# Patient Record
Sex: FEMALE | Race: OTHER | Employment: UNEMPLOYED | URBAN - METROPOLITAN AREA
[De-identification: names, ages, dates, MRNs, and addresses within clinical notes are randomized per-mention and may not be internally consistent; named-entity substitution may affect disease eponyms.]

---

## 2017-07-15 ENCOUNTER — OFFICE VISIT (OUTPATIENT)
Dept: FAMILY MEDICINE CLINIC | Age: 52
End: 2017-07-15

## 2017-07-15 ENCOUNTER — HOSPITAL ENCOUNTER (OUTPATIENT)
Dept: LAB | Age: 52
Discharge: HOME | End: 2017-07-15

## 2017-07-15 VITALS
HEIGHT: 59 IN | WEIGHT: 242 LBS | SYSTOLIC BLOOD PRESSURE: 130 MMHG | HEART RATE: 72 BPM | DIASTOLIC BLOOD PRESSURE: 71 MMHG | OXYGEN SATURATION: 99 % | BODY MASS INDEX: 48.79 KG/M2 | TEMPERATURE: 98.2 F

## 2017-07-15 DIAGNOSIS — I10 ESSENTIAL HYPERTENSION: Primary | ICD-10-CM

## 2017-07-15 DIAGNOSIS — R73.03 PREDIABETES: ICD-10-CM

## 2017-07-15 DIAGNOSIS — I10 ESSENTIAL HYPERTENSION: ICD-10-CM

## 2017-07-15 LAB
CHOLEST SERPL-MCNC: 179 MG/DL
GLUCOSE POC: NORMAL MG/DL
HDLC SERPL-MCNC: 53 MG/DL
HDLC SERPL: 3.4 {RATIO} (ref 0–5)
LDLC SERPL CALC-MCNC: 100.4 MG/DL (ref 0–100)
LIPID PROFILE,FLP: ABNORMAL
TRIGL SERPL-MCNC: 128 MG/DL (ref ?–150)
VLDLC SERPL CALC-MCNC: 25.6 MG/DL

## 2017-07-15 PROCEDURE — 80061 LIPID PANEL: CPT | Performed by: NURSE PRACTITIONER

## 2017-07-15 RX ORDER — ATENOLOL AND CHLORTHALIDONE TABLET 50; 25 MG/1; MG/1
0.5 TABLET ORAL DAILY
Qty: 90 TAB | Refills: 1 | Status: SHIPPED | OUTPATIENT
Start: 2017-07-15 | End: 2019-04-02 | Stop reason: SDUPTHER

## 2017-07-15 NOTE — PROGRESS NOTES
Assessment/Plan:       ICD-10-CM ICD-9-CM    1. Essential hypertension I10 401.9 atenolol-chlorthalidone (TENORETIC) 50-25 mg per tablet      LIPID PANEL   2. Prediabetes R73.03 790.29 AMB POC GLUCOSE BLOOD, BY GLUCOSE MONITORING DEVICE       [facility]  Subjective:     Chief Complaint   Patient presents with    Hypertension     f/u    Medication Refill    Aura D [address] is a 47 y.o. PATIENT REFUSED female. HPI: She has been going to the gym. She can't go back, no transportation. 2 months ago she has been taking 1/2 of the pill. Takes it in middle of the afternoon. She felt a headache immediately and dizzy, lasted for 5-10 minutes. This started 2 months ago. Started 5 months ago with the gym for 3 months. She has no more dizziness and headaches. She eats less. Not eating fat food for 5 months. She  has a past medical history of Hypertension; Obesity; and Prediabetes. She also has no past medical history of Diabetes ([address] or Sleep apnea. She has Hypertension, Prediabetes, and Slow transit constipation on her problem list.   Review of Systems: Negative for: fever, chest pain, shortness of breath, leg swelling, exertional dyspnea, palpitations. Current Medications:   Current Outpatient Prescriptions on File Prior to Visit   Medication Sig    [DISCONTINUED] atenolol-chlorthalidone (TENORETIC) 50-25 mg per tablet Take 1 Tab by mouth daily. nadine 1 por la boca diaria    omega 3-dha-epa-fish oil (FISH OIL) 900-1,400 mg cpDR Twice a day    aspirin delayed-release 81 mg tablet Take 1 Tab by mouth daily. To protect your heart. Mongolian sig     No current facility-administered medications on file prior to visit. Past Surgical History: She  has no past surgical history on file. Social and Family History: She  reports that she has never smoked. She does not have any smokeless tobacco history on file. She reports that she does not drink alcohol or use illicit drugs. ; family history is negative for Hypertension and Diabetes. Objective:     Vitals:    07/15/17 1044   BP: 130/71   Pulse: 72   Temp: 98.2 °F (36.8 °C)   TempSrc: Oral   SpO2: 99%   Weight: 242 lb (109.8 kg)   Height: 4' 10.66\" (1.49 m)    Patient's last menstrual period was 06/02/2014. Wt Readings from Last 2 Encounters:   07/15/17 242 lb (109.8 kg)   10/01/16 241 lb (109.3 kg)   We discussed she is 1 lb more than her last weight currently. Results for orders placed or performed in visit on 07/15/17   AMB POC GLUCOSE BLOOD, BY GLUCOSE MONITORING DEVICE   Result Value Ref Range    Glucose POC 98 F mg/dL      Physical Examination:  General appearance - well developed, no acute distress. Chest - clear to auscultation. Heart - regular rate and rhythm without murmurs, rubs, or gallops. Abdomen - bowel sounds present x 4, NT, ND. Extremities - pulses intact. No peripheral edema. Assessment/Plan:   Rafael Monterroso was seen today for hypertension and medication refill. Diagnoses and all orders for this visit:    Essential hypertension  -     atenolol-chlorthalidone (TENORETIC) 50-25 mg per tablet; Take 0.5 Tabs by mouth daily. nadine la mitad por la boca diaria  -     LIPID PANEL; Future    Prediabetes  -     AMB POC GLUCOSE BLOOD, BY GLUCOSE MONITORING DEVICE    25/12.5 - it doesn't come in this strength; she prefers to continue cutting in half. Monitor bp, return if problems  Follow-up Disposition:  Return in about 12 months (around 7/15/2018) for or sooner as needed. Guy Car, IVONE, FNP-BC, BC-ADM  [address] expressed understanding of this plan. She is NOT taking fish oil currently.

## 2017-07-15 NOTE — PROGRESS NOTES
Coordination of Care  1. Have you been to the ER, urgent care clinic since your last visit? Hospitalized since your last visit? No    2. Have you seen or consulted any other health care providers outside of the [address] [place] [address] since your last visit? Include any pap smears or colon screening.  No    Medications  Medication Reconciliation Performed: no  Patient does need refills     Learning Assessment Complete? yes   Results for orders placed or performed in visit on 07/15/17   AMB POC GLUCOSE BLOOD, BY GLUCOSE MONITORING DEVICE   Result Value Ref Range    Glucose POC 98 F mg/dL

## 2018-04-14 ENCOUNTER — OFFICE VISIT (OUTPATIENT)
Dept: FAMILY MEDICINE CLINIC | Age: 53
End: 2018-04-14

## 2018-04-14 ENCOUNTER — HOSPITAL ENCOUNTER (OUTPATIENT)
Dept: LAB | Age: 53
Discharge: HOME | End: 2018-04-14

## 2018-04-14 VITALS
HEART RATE: 76 BPM | SYSTOLIC BLOOD PRESSURE: 133 MMHG | BODY MASS INDEX: 48.63 KG/M2 | TEMPERATURE: 98.3 F | DIASTOLIC BLOOD PRESSURE: 79 MMHG | WEIGHT: 238 LBS

## 2018-04-14 DIAGNOSIS — M72.2 PLANTAR FASCIITIS, RIGHT: Primary | ICD-10-CM

## 2018-04-14 DIAGNOSIS — K59.01 SLOW TRANSIT CONSTIPATION: ICD-10-CM

## 2018-04-14 PROBLEM — E66.01 OBESITY, MORBID (HCC): Status: ACTIVE | Noted: 2018-04-14

## 2018-04-14 LAB — TSH SERPL DL<=0.05 MIU/L-ACNC: 1.57 UIU/ML (ref 0.36–3.74)

## 2018-04-14 PROCEDURE — 84443 ASSAY THYROID STIM HORMONE: CPT | Performed by: NURSE PRACTITIONER

## 2018-04-14 NOTE — PATIENT INSTRUCTIONS
Fascitis plantar: Instrucciones de cuidado - [ Plantar Fasciitis: Care Instructions ]  Instrucciones de cuidado    La fascitis plantar es un dolor e inflamación de la fascia plantar, el tejido en la parte inferior del pie que conecta el hueso del talón a los dedos del pie. La fascia plantar también sostiene el arco. Si tensiona la fascia plantar, puede desarrollar pequeños desgarros y causar dolor en el talón al levantarse o al caminar. La fascitis plantar puede ser ocasionada por correr o practicar otros deportes. También puede presentarse en personas con sobrepeso o que tienen inessa altos o pies planos. Usted puede tener fascitis plantar si camina o permanece parado [employer], o tiene un tendón de Emiliano tenso o músculos de la pantorrilla rígidos. Puede mejorar leger dolor de pie con descanso y [de-identified] cuidados en el [facility]. Podría llevar unas cuantas semanas a algunos meses para que leger pie sane completamente. La atención de seguimiento es hayley parte clave de leger tratamiento y seguridad. Asegúrese de hacer y acudir a todas las citas, y llame a leger médico si está teniendo problemas. También es hayley buena idea saber los resultados de los exámenes y mantener hayley lista de los medicamentos que nadine. ¿Cómo puede cuidarse en el [facility]? · Descanse leger pie a menudo. Reduzca lgeer actividad hasta un nivel que le permita evitar el dolor. Si es posible, no corra ni camine sobre superficies duras. · [employer] analgésicos (medicamentos para el dolor) exactamente según las indicaciones. ¨ Si el médico le recetó un analgésico, tómelo según las indicaciones. ¨ Si no está tomando un analgésico recetado, tómese un antiinflamatorio de venta joseph para el dolor y la hinchazón, shira ibuprofeno (Advil, Motrin) o naproxeno (Aleve). Neela y siga todas las instrucciones de la [place]. · Aplíquese masajes con hielo para ayudar con el dolor y la hinchazón. Puede utilizar cubitos de hielo o hayley copa de hielo varias veces al día.  Para hacer hayley copa de hielo, llene hayley taza de papel con agua y congélela. Ilir la parte superior de la taza hasta que sobresalga media pulgada (1.25 cm) de hielo. Sosténgala por la parte remanente de papel para utilizar la copa. Frote el hielo en círculos pequeños sobre la diann linn 5 a 7 minutos. · Los tate alternados con [place] y agua fría también pueden ayudar a disminuir la hinchazón. Tate shira el calor solo podría empeorar el dolor y la hinchazón, finalice un baño de contraste sumergiendo el pie en agua fría. · Use hayley tablilla (férula) nocturna si leger médico se lo sugiere. Hayley tablilla nocturna mantiene el pie con los dedos apuntando [place] y el pie y el tobillo a un ángulo de 90 grados. Esta posición le proporciona un estiramiento suave y christophe a la parte inferior del pie. · Carlos ejercicios simples shira estiramientos de la pantorrilla y estiramientos con hayley toalla 2 a 3 veces al día, especialmente al [employer]. Éstos pueden ayudar a la fascia plantar a volverse más flexible. También hacen que se fortalezcan los músculos que soportan el arco. Mantenga estos estiramientos linn 15 a 30 segundos cada vez. Repítalos 2 a 4 veces. ¨ Párese a 1 pie (31 cm) de la pared. Coloque las quentin de ambas marely contra la pared a la altura del pecho. Inclínese hacia adelante contra la pared, mantenga hayley pierna con la rodilla recta y el talón en el suelo mientras dobla la rodilla de la otra pierna. ¨ Siéntese en el suelo o en hayley colchoneta con los pies estirados al frente. Enrolle hayley toalla a lo latonia y colóquela alrededor de la parte anterior de la planta de muriel de los pies. Sosteniendo la toalla por ambos extremos, jálela suavemente hacia usted para estirar leger pie. · Use zapatos con buen soporte para el arco. El calzado deportivo o los zapatos con la suela segundo acolchada son hayley Pamela [place]. · Pruebe con cuñas o plantillas (ortóticos) para ayudar a amortiguar el talón.  Se pueden comprar en muchas tiendas de calzado. · Póngase los zapatos tan pronto se levante de la cama. Andar descalzo o usar pantuflas podría empeorar el dolor. · Alcance un buen peso de acuerdo a leger estatura, y [place]. [place] nichole la carga de los pies. ¿Cuándo debe pedir ayuda? Llame a leger médico ahora mismo o busque atención médica inmediata si:  ? · Tiene dolor en el talón con fiebre, enrojecimiento o calor. ? · No puede cargar [employer] adolorido. ?Preste especial atención a los cambios en leger [place] y asegúrese de comunicarse con leger médico si:  ? · Siente hormigueo o entumecimiento en el talón. ? · El dolor en el talón dura más de 2 semanas. ¿Dónde puede encontrar más información en inglés? Minor [place] a http://richard-libby.info/. Jaden Portillo U478 en la búsqueda para aprender más acerca de \"Fascitis plantar: Instrucciones de cuidado - [ Plantar Fasciitis: Care Instructions ]. \"  Revisado: 21 marzo, 2017  Versión del contenido: 11.4  © 2573-6385 Healthwise, Incorporated. Las instrucciones de cuidado fueron adaptadas bajo licencia por [employer] (which disclaims liability or warranty for this information). Si usted tiene Walden Casey afección médica o sobre estas instrucciones, siempre pregunte a leger profesional de [place]. Healthwise, Incorporated niega toda garantía o responsabilidad por leger uso de esta información.

## 2018-04-14 NOTE — PROGRESS NOTES
Coordination of Care  1. Have you been to the ER, urgent care clinic since your last visit? Hospitalized since your last visit? No    2. Have you seen or consulted any other health care providers outside of the [address] [place] [address] since your last visit? Include any pap smears or colon screening. No    Does the patient need refills? NO    Learning Assessment Complete?  yes

## 2018-04-14 NOTE — PROGRESS NOTES
[address] [address] seen at d/c, given AVS and reviewed today's visit with patient. discussed taking otc ibprofen 600mg as directed by provider. Patient told to call clinic office with any questions/concerns about their visit, phone # given; explained to leave one voice message if no one answers phone with their name, phone #,  and reason for call and someone will return their call. Discussed ed materials included in AVS on plantar fascitis and how to self care at home. Explained she will get a phone call about lab results in about two weeks. All questions were answered to patient satisfaction.

## 2018-04-14 NOTE — PROGRESS NOTES
Assessment/Plan:       ICD-10-CM ICD-9-CM    1. Plantar fasciitis, right M72.2 728.71    2. Slow transit constipation K59.01 564.01 TSH 3RD GENERATION     Follow-up Disposition:  Return if symptoms worsen or fail to improve. SALAS[address]Marlene [address] Devika Ortega  [address] [facility] [address] [place] [address]  Phone: [phone] Fax: [phone]      [facility] [address]  Subjective:     Chief Complaint   Patient presents with    Other     labs    Foot Pain     right foot pain/tingling x 1 month    [address] [facility] [address] is a 46 y.o. PATIENT REFUSED female. HPI:   She  has a past medical history of Hypertension; Obesity; and Prediabetes. She also has no past medical history of Diabetes ([address] or Sleep apnea. Review of Systems: Negative for: fever, chest pain, shortness of breath, leg swelling, exertional dyspnea, palpitations. She is seeing a nutritionist who recommends a TSH. Current Medications:   Current Outpatient Prescriptions on File Prior to Visit   Medication Sig    atenolol-chlorthalidone (TENORETIC) 50-25 mg per tablet Take 0.5 Tabs by mouth daily. nadine la mitad por la boca diaria    omega 3-dha-epa-fish oil (FISH OIL) 900-1,400 mg cpDR Twice a day    aspirin delayed-release 81 mg tablet Take 1 Tab by mouth daily. To protect your heart. [place] sorbian sig     No current facility-administered medications on file prior to visit. Social History: She  reports that she has never smoked. She has never used smokeless tobacco. She reports that she does not drink alcohol or use illicit drugs. Objective:     Vitals:    04/14/18 1045   BP: 133/79   Pulse: 76   Temp: 98.3 °F (36.8 °C)   TempSrc: Oral   Weight: 238 lb (108 kg)    Patient's last menstrual period was 06/02/2014. Wt Readings from Last 2 Encounters:   04/14/18 238 lb (108 kg)   07/15/17 242 lb (109.8 kg)     No results found for any visits on 04/14/18.    Physical Examination:  General appearance - well developed, no acute distress. Chest - clear to auscultation. Heart - regular rate and rhythm without murmurs, rubs, or gallops. Abdomen - bowel sounds present x 4, NT, ND. Extremities - pulses intact. No peripheral edema. + tenderness with palpation of heel. Assessment/Plan:   Diagnoses and all orders for this visit:    1. Plantar fasciitis, right    2. Slow transit constipation  -     TSH 3RD GENERATION; Future      Follow-up Disposition:  Return if symptoms worsen or fail to improve. Jessica Novoa, DNP, FNP-BC, BC-ADM  Lelia Goodeigham expressed understanding of this plan.

## 2019-03-23 ENCOUNTER — APPOINTMENT (OUTPATIENT)
Dept: GENERAL RADIOLOGY | Age: 54
End: 2019-03-23
Attending: EMERGENCY MEDICINE
Payer: SELF-PAY

## 2019-03-23 ENCOUNTER — APPOINTMENT (OUTPATIENT)
Dept: CT IMAGING | Age: 54
End: 2019-03-23
Attending: EMERGENCY MEDICINE
Payer: SELF-PAY

## 2019-03-23 ENCOUNTER — HOSPITAL ENCOUNTER (EMERGENCY)
Age: 54
Discharge: HOME | End: 2019-03-23
Attending: EMERGENCY MEDICINE
Payer: SELF-PAY

## 2019-03-23 VITALS
TEMPERATURE: 98.1 F | WEIGHT: 250 LBS | RESPIRATION RATE: 19 BRPM | SYSTOLIC BLOOD PRESSURE: 130 MMHG | DIASTOLIC BLOOD PRESSURE: 77 MMHG | HEART RATE: 90 BPM | OXYGEN SATURATION: 99 % | HEIGHT: 58 IN | BODY MASS INDEX: 52.48 KG/M2

## 2019-03-23 DIAGNOSIS — R07.9 CHEST PAIN, UNSPECIFIED TYPE: ICD-10-CM

## 2019-03-23 DIAGNOSIS — I10 HYPERTENSION, UNSPECIFIED TYPE: Primary | ICD-10-CM

## 2019-03-23 LAB
ALBUMIN SERPL-MCNC: 3.7 G/DL (ref 3.5–5)
ALBUMIN/GLOB SERPL: 0.9 {RATIO} (ref 1.1–2.2)
ALP SERPL-CCNC: 97 U/L (ref 45–117)
ALT SERPL-CCNC: 42 U/L (ref 12–78)
ANION GAP SERPL CALC-SCNC: 4 MMOL/L (ref 5–15)
AST SERPL-CCNC: 37 U/L (ref 15–37)
BASOPHILS # BLD: 0 K/UL (ref 0–0.1)
BASOPHILS NFR BLD: 0 % (ref 0–1)
BILIRUB SERPL-MCNC: 0.5 MG/DL (ref 0.2–1)
BNP SERPL-MCNC: 23 PG/ML
BUN SERPL-MCNC: 12 MG/DL (ref 6–20)
BUN/CREAT SERPL: 21 (ref 12–20)
CALCIUM SERPL-MCNC: 8.9 MG/DL (ref 8.5–10.1)
CHLORIDE SERPL-SCNC: 104 MMOL/L (ref 97–108)
CO2 SERPL-SCNC: 29 MMOL/L (ref 21–32)
COMMENT, HOLDF: NORMAL
CREAT SERPL-MCNC: 0.57 MG/DL (ref 0.55–1.02)
DIFFERENTIAL METHOD BLD: NORMAL
EOSINOPHIL # BLD: 0.1 K/UL (ref 0–0.4)
EOSINOPHIL NFR BLD: 1 % (ref 0–7)
ERYTHROCYTE [DISTWIDTH] IN BLOOD BY AUTOMATED COUNT: 12 % (ref 11.5–14.5)
GLOBULIN SER CALC-MCNC: 4 G/DL (ref 2–4)
GLUCOSE SERPL-MCNC: 103 MG/DL (ref 65–100)
HCT VFR BLD AUTO: 43.6 % (ref 35–47)
HGB BLD-MCNC: 14.3 G/DL (ref 11.5–16)
IMM GRANULOCYTES # BLD AUTO: 0 K/UL (ref 0–0.04)
IMM GRANULOCYTES NFR BLD AUTO: 0 % (ref 0–0.5)
LYMPHOCYTES # BLD: 3.2 K/UL (ref 0.8–3.5)
LYMPHOCYTES NFR BLD: 42 % (ref 12–49)
MCH RBC QN AUTO: 28.9 PG (ref 26–34)
MCHC RBC AUTO-ENTMCNC: 32.8 G/DL (ref 30–36.5)
MCV RBC AUTO: 88.3 FL (ref 80–99)
MONOCYTES # BLD: 0.8 K/UL (ref 0–1)
MONOCYTES NFR BLD: 10 % (ref 5–13)
NEUTS SEG # BLD: 3.6 K/UL (ref 1.8–8)
NEUTS SEG NFR BLD: 47 % (ref 32–75)
NRBC # BLD: 0 K/UL (ref 0–0.01)
NRBC BLD-RTO: 0 PER 100 WBC
PLATELET # BLD AUTO: 262 K/UL (ref 150–400)
PMV BLD AUTO: 9.8 FL (ref 8.9–12.9)
POTASSIUM SERPL-SCNC: 4.1 MMOL/L (ref 3.5–5.1)
PROT SERPL-MCNC: 7.7 G/DL (ref 6.4–8.2)
RBC # BLD AUTO: 4.94 M/UL (ref 3.8–5.2)
SAMPLES BEING HELD,HOLD: NORMAL
SODIUM SERPL-SCNC: 137 MMOL/L (ref 136–145)
TROPONIN I BLD-MCNC: <0.04 NG/ML (ref 0–0.08)
TROPONIN I SERPL-MCNC: <0.05 NG/ML
WBC # BLD AUTO: 7.7 K/UL (ref 3.6–11)

## 2019-03-23 PROCEDURE — 96374 THER/PROPH/DIAG INJ IV PUSH: CPT

## 2019-03-23 PROCEDURE — 84484 ASSAY OF TROPONIN QUANT: CPT

## 2019-03-23 PROCEDURE — 71045 X-RAY EXAM CHEST 1 VIEW: CPT

## 2019-03-23 PROCEDURE — 36415 COLL VENOUS BLD VENIPUNCTURE: CPT

## 2019-03-23 PROCEDURE — 71275 CT ANGIOGRAPHY CHEST: CPT

## 2019-03-23 PROCEDURE — 74011636320 HC RX REV CODE- 636/320: Performed by: INTERNAL MEDICINE

## 2019-03-23 PROCEDURE — 96361 HYDRATE IV INFUSION ADD-ON: CPT

## 2019-03-23 PROCEDURE — 85025 COMPLETE CBC W/AUTO DIFF WBC: CPT

## 2019-03-23 PROCEDURE — 80053 COMPREHEN METABOLIC PANEL: CPT

## 2019-03-23 PROCEDURE — 74011250636 HC RX REV CODE- 250/636: Performed by: EMERGENCY MEDICINE

## 2019-03-23 PROCEDURE — 93005 ELECTROCARDIOGRAM TRACING: CPT

## 2019-03-23 PROCEDURE — 99285 EMERGENCY DEPT VISIT HI MDM: CPT

## 2019-03-23 PROCEDURE — 83880 ASSAY OF NATRIURETIC PEPTIDE: CPT

## 2019-03-23 RX ORDER — LISINOPRIL 10 MG/1
10 TABLET ORAL DAILY
Qty: 30 TAB | Refills: 0 | Status: SHIPPED | OUTPATIENT
Start: 2019-03-23 | End: 2019-04-02

## 2019-03-23 RX ORDER — FUROSEMIDE 10 MG/ML
20 INJECTION INTRAMUSCULAR; INTRAVENOUS
Status: DISCONTINUED | OUTPATIENT
Start: 2019-03-23 | End: 2019-03-23

## 2019-03-23 RX ORDER — HYDRALAZINE HYDROCHLORIDE 20 MG/ML
20 INJECTION INTRAMUSCULAR; INTRAVENOUS
Status: COMPLETED | OUTPATIENT
Start: 2019-03-23 | End: 2019-03-23

## 2019-03-23 RX ADMIN — SODIUM CHLORIDE 500 ML: 900 INJECTION, SOLUTION INTRAVENOUS at 02:57

## 2019-03-23 RX ADMIN — HYDRALAZINE HYDROCHLORIDE 20 MG: 20 INJECTION INTRAMUSCULAR; INTRAVENOUS at 02:57

## 2019-03-23 RX ADMIN — IOPAMIDOL 85 ML: 755 INJECTION, SOLUTION INTRAVENOUS at 06:46

## 2019-03-23 NOTE — ED PROVIDER NOTES
53 y.o. female with past medical history significant for HTN, who presents from home accompanied by a friend with chief complaint of chest pain. Pt states at 0100 this morning she had the onset of mid-sternal chest pain with shortness of breath and the sensation that her entire body was numb. She notes the chest pain is associated with taking deep breaths and radiates to her bilateral arms and through to her back. Pt also reports mild nausea and current numbness in her bilateral fingers (right is worse than left). She denies any history of similar symptoms in the past. She denies any history of blood clots. Pt specifically denies any diaphoresis, fever, chills, cough, congestion, abdominal pain, vomiting. There are no other acute medical concerns at this time. Social Hx: denies tobacco use, denies EtOH use, denies Illicit Drug use PCP: None Note written by Shirley Jay, as dictated by Yamel Charles MD 2:40 AM 
 
The history is provided by the patient. A  was used. Past Medical History:  
Diagnosis Date  Hypertension  Obesity  Prediabetes No past surgical history on file. Family History:  
Problem Relation Age of Onset  Hypertension Neg Hx  Diabetes Neg Hx Social History Socioeconomic History  Marital status: SINGLE Spouse name: Not on file  Number of children: Not on file  Years of education: Not on file  Highest education level: Not on file Occupational History  Not on file Social Needs  Financial resource strain: Not on file  Food insecurity:  
  Worry: Not on file Inability: Not on file  Transportation needs:  
  Medical: Not on file Non-medical: Not on file Tobacco Use  Smoking status: Never Smoker  Smokeless tobacco: Never Used Substance and Sexual Activity  Alcohol use: No  
  Alcohol/week: 0.0 oz  Drug use: No  
 Sexual activity: Not on file Lifestyle  Physical activity:  
  Days per week: Not on file Minutes per session: Not on file  Stress: Not on file Relationships  Social connections:  
  Talks on phone: Not on file Gets together: Not on file Attends Jewish service: Not on file Active member of club or organization: Not on file Attends meetings of clubs or organizations: Not on file Relationship status: Not on file  Intimate partner violence:  
  Fear of current or ex partner: Not on file Emotionally abused: Not on file Physically abused: Not on file Forced sexual activity: Not on file Other Topics Concern  Not on file Social History Narrative  Not on file ALLERGIES: Patient has no known allergies. Review of Systems Constitutional: Negative for chills, diaphoresis and fever. HENT: Negative for congestion and sore throat. Eyes: Negative for pain. Respiratory: Positive for shortness of breath. Cardiovascular: Positive for chest pain. Gastrointestinal: Positive for nausea. Negative for abdominal pain, diarrhea and vomiting. Genitourinary: Negative for dysuria and flank pain. Musculoskeletal: Positive for back pain and myalgias (BL arms). Negative for neck pain. Skin: Negative for rash. Neurological: Positive for numbness. Negative for dizziness and headaches. Vitals:  
 03/23/19 0231 BP: (!) 202/114 Pulse: 96  
Resp: 19 Temp: 98.1 °F (36.7 °C) SpO2: 99% Weight: 113.4 kg (250 lb) Height: 4' 10\" (1.473 m) Physical Exam  
Constitutional: She is oriented to person, place, and time. She appears well-developed. Morbidly obese HENT:  
Head: Normocephalic. Mouth/Throat: Oropharynx is clear and moist.  
Eyes: Conjunctivae are normal.  
Neck: Normal range of motion. Neck supple. Cardiovascular: Normal rate and regular rhythm. Pulmonary/Chest: Effort normal and breath sounds normal. No respiratory distress. Abdominal: Soft. Bowel sounds are normal. There is no tenderness. Musculoskeletal: Normal range of motion. Neurological: She is alert and oriented to person, place, and time. No gross motor or sensory deficits Skin: Skin is warm. Capillary refill takes less than 2 seconds. No rash noted. Note written by Shirley Hobbs, as dictated by Angelina Sampson MD 2:40 AM 
 
Recent Results (from the past 24 hour(s)) SAMPLES BEING HELD Collection Time: 03/23/19  2:41 AM  
Result Value Ref Range SAMPLES BEING HELD PST,LAV,SERVANDO   
 COMMENT Add-on orders for these samples will be processed based on acceptable specimen integrity and analyte stability, which may vary by analyte. METABOLIC PANEL, COMPREHENSIVE Collection Time: 03/23/19  2:41 AM  
Result Value Ref Range Sodium 137 136 - 145 mmol/L Potassium 4.1 3.5 - 5.1 mmol/L Chloride 104 97 - 108 mmol/L  
 CO2 29 21 - 32 mmol/L Anion gap 4 (L) 5 - 15 mmol/L Glucose 103 (H) 65 - 100 mg/dL BUN 12 6 - 20 MG/DL Creatinine 0.57 0.55 - 1.02 MG/DL  
 BUN/Creatinine ratio 21 (H) 12 - 20 GFR est AA >60 >60 ml/min/1.73m2 GFR est non-AA >60 >60 ml/min/1.73m2 Calcium 8.9 8.5 - 10.1 MG/DL Bilirubin, total 0.5 0.2 - 1.0 MG/DL  
 ALT (SGPT) 42 12 - 78 U/L  
 AST (SGOT) 37 15 - 37 U/L Alk. phosphatase 97 45 - 117 U/L Protein, total 7.7 6.4 - 8.2 g/dL Albumin 3.7 3.5 - 5.0 g/dL Globulin 4.0 2.0 - 4.0 g/dL A-G Ratio 0.9 (L) 1.1 - 2.2    
TROPONIN I Collection Time: 03/23/19  2:41 AM  
Result Value Ref Range Troponin-I, Qt. <0.05 <0.05 ng/mL NT-PRO BNP Collection Time: 03/23/19  2:41 AM  
Result Value Ref Range NT pro-BNP 23 <125 PG/ML  
CBC WITH AUTOMATED DIFF Collection Time: 03/23/19  3:46 AM  
Result Value Ref Range WBC 7.7 3.6 - 11.0 K/uL  
 RBC 4.94 3.80 - 5.20 M/uL  
 HGB 14.3 11.5 - 16.0 g/dL HCT 43.6 35.0 - 47.0 %  MCV 88.3 80.0 - 99.0 FL  
 MCH 28.9 26.0 - 34.0 PG  
 MCHC 32.8 30.0 - 36.5 g/dL  
 RDW 12.0 11.5 - 14.5 % PLATELET 697 529 - 923 K/uL MPV 9.8 8.9 - 12.9 FL  
 NRBC 0.0 0  WBC ABSOLUTE NRBC 0.00 0.00 - 0.01 K/uL NEUTROPHILS 47 32 - 75 % LYMPHOCYTES 42 12 - 49 % MONOCYTES 10 5 - 13 % EOSINOPHILS 1 0 - 7 % BASOPHILS 0 0 - 1 % IMMATURE GRANULOCYTES 0 0.0 - 0.5 % ABS. NEUTROPHILS 3.6 1.8 - 8.0 K/UL  
 ABS. LYMPHOCYTES 3.2 0.8 - 3.5 K/UL  
 ABS. MONOCYTES 0.8 0.0 - 1.0 K/UL  
 ABS. EOSINOPHILS 0.1 0.0 - 0.4 K/UL  
 ABS. BASOPHILS 0.0 0.0 - 0.1 K/UL  
 ABS. IMM. GRANS. 0.0 0.00 - 0.04 K/UL  
 DF AUTOMATED    
POC TROPONIN-I Collection Time: 03/23/19  6:22 AM  
Result Value Ref Range Troponin-I (POC) <0.04 0.00 - 0.08 ng/mL Cta Chest W Or W Wo Cont Result Date: 3/23/2019 INDICATION: chest pain/back pain numbness EXAM: CT Angio Chest: TECHNIQUE: Unenhanced localizing CT imaging of the thoracic aorta is followed by bolus injection of 100 mL Isovue 370 contrast IV, with thin section axial Chest CT obtained and 3D image post processing performed including coronal MIPS. CT dose reduction was achieved through use of a standardized protocol tailored for this examination and automatic exposure control for dose modulation. FINDINGS: There is no aortic dissection or aneurysm. Central pulmonary arteries show no embolus. Lungs show small airspace disease in the right lower lobe, nonspecific, possibly atelectasis versus early pneumonia versus scarring versus other. There is no pneumothorax. There is no pleural or significant pericardial effusion. There is no significant adenopathy. Visualized thyroid and lower neck soft tissues are unremarkable for age. IMPRESSION: 1. No thoracic aortic aneurysm or dissection. 2. Small right lower lobe airspace disease as discussed. Xr Chest [facility] Result Date: 3/23/2019 EXAM:  XR CHEST PORT INDICATION:   CP COMPARISON: None.  FINDINGS: AP radiograph of the chest was obtained. No evidence of focal consolidation. No pleural effusion or pneumothorax. Mild cardiomegaly with calcifications of the thoracic aorta. Pulmonary vascular congestion without overt pulmonary edema. No acute osseous abnormalities. IMPRESSION: Pulmonary vascular congestion without overt pulmonary edema. Mild cardiomegaly. MDM Number of Diagnoses or Management Options Chest pain, unspecified type: Hypertension, unspecified type:  
  
Amount and/or Complexity of Data Reviewed Clinical lab tests: reviewed Tests in the radiology section of CPT®: reviewed ED Course as of Mar 23 0741 Sat Mar 23, 2019  
0356 /89 after hydralazine 20mg IV [ZD] ED Course User Index [ZD] Devika Burgess MD  
 
 
Procedures EKG interpretation: (Preliminary) 2:27 AM 
Rhythm: normal sinus rhythm; and regular . Rate (approx.): 74; Axis: normal; DE interval: normal; QRS interval: normal ; ST/T wave: normal; Other findings: normal. 
Note written by Shirley Martinez, as dictated by Devika Burgess MD 2:40 AM 
 
  
 
Discussed the discharge impression and any labs and the results with the patient. Answered any questions and addressed any concerns. Discussed the importance of following up with their primary care provider and/or specialist. 
Discussed signs or symptoms that would warrant return back to the ER for further evaluation. The patient is agreeable with discharge. Used .

## 2019-03-23 NOTE — DISCHARGE INSTRUCTIONS
Patient Education        Presión arterial guerda: Instrucciones de cuidado - [ High Blood Pressure: Care Instructions ]  Instrucciones de cuidado    Si leger presión arterial suele ser superior a 130/80, usted tiene presión arterial guerda o hipertensión. [place] significa que el número de [place] es 130 o más alto o que el número de abajo es [de-identified] o [address], o ambas cosas. A pesar de lo que mucha gente pushpa, la hipertensión no suele causar dolor de ruben ni provocar mareos o aturdimiento. Generalmente, no presenta síntomas. Sin embargo, aumenta el riesgo de ataque al corazón, ataque cerebral, y daños en los riñones o en los ojos. A mayor presión arterial, mayor riesgo. Leger médico le dará hayley meta para leger presión arterial. Leger meta se basará en leger virginia y leger edad. Los cambios de estilo de shalini, shira alimentarse en forma saludable y [place], siempre son importantes para ayudarle a bajar la presión arterial. También podría paul medicamentos para lograr leger meta para la presión arterial.  La atención de seguimiento es hayley parte clave de leger tratamiento y seguridad. Asegúrese de hacer y acudir a todas las citas, y llame a leger médico si está teniendo problemas. También es hayley buena idea saber los resultados de shoshana exámenes y mantener hayley lista de los medicamentos que nadine. ¿Cómo puede cuidarse en el hogar? Tratamiento médico  · Si henok de paul shoshana medicamentos, leger presión arterial volverá a subir. Es posible que deba paul un tipo de [place], o más de [place], para reducir la presión arterial. [place] con los medicamentos. [place] leger medicamento exactamente shira se lo hayan indicado. Llame a leger médico si pushpa estar teniendo problemas con leger medicamento. · Hable con leger médico antes de empezar a paul [facility].  La aspirina puede ayudar a determinadas personas a reducir leger riesgo de tener un ataque cardíaco o un ataque cerebral. [place] paul aspirina no es adecuado para todo el TRENGEREID, debido a que puede causar sangrado grave. · Visite a leger médico periódicamente. Usted podría necesitar consultar a leger médico con más frecuencia al principio o hasta que se reduzca leger presión arterial.  · Si usted está tomando medicamentos para la presión arterial, hable con leger médico antes de paul medicamentos descongestionantes o antiinflamatorios, shira ibuprofeno. Algunos de estos medicamentos pueden elevar la presión arterial.  · Aprenda a revisarse la presión arterial en leger hogar. Cambios en el estilo de shalini  · Mantenga un peso saludable. [place] es particularmente importante si aumenta de peso en la diann de la cintura. Bajar solo 10 libras (4.5 kg) puede ayudarle a reducir leger presión arterial.  · Carlos más ejercicios si leger médico se lo recomienda. Caminar es hayley buena opción. Poco a poco, aumente la distancia que [employer]. Intente hacer por lo menos 30 minutos de ejercicio la mayoría de los días de la [place]. También podría nadar, montar en bicicleta o hacer otras actividades. · No tome alcohol o limite la cantidad que johnny. Hable con leger médico acerca de si puede paul alcohol. · Trate de limitar la cantidad de sodio que consume a menos de 2,300 miligramos (mg) al día. Leger médico podría pedirle que trate de consumir menos de 1,500 mg al día. · Coma abundantes frutas (shira bananas [plátanos] y naranjas), verduras, legumbres, granos integrales y productos lácteos de bajo contenido de [place]. · Reduzca la cantidad de grasas saturadas en leger dieta. Los productos derivados de los [place], shira la [place], el queso y la carne, contienen grasas saturadas. El limitar estos alimentos podría ayudarle a bajar de peso y [place] reducir leger riesgo de hayley enfermedad cardíaca. · No fume. El hábito de fumar aumenta leger riesgo de ataque cerebral y ataque al corazón. Si necesita ayuda para dejar de fumar, hable con leger médico sobre programas y medicamentos para dejar de fumar.  Estos pueden aumentar shoshana probabilidades de dejar el hábito para siempre. ¿Cuándo debe pedir ayuda? Llame al 911 en cualquier momento que considere que necesita atención de [place]. [place] puede significar que tiene síntomas que sugieren que leger presión arterial le está causando un problema cardíaco o vascular grave. Leger presión arterial podría ser superior a 180/110.   Por ejemplo, llame al 911 si:    · Tiene síntomas de un ataque al corazón. Estos pueden incluir:  ? Julian Bal en el pecho, o hayley sensación extraña en el pecho.  ? Sudoración. ? Falta de aire. ? Náuseas o vómito. ? Dolor, presión o hayley sensación extraña en la espalda, el felecia, la mandíbula, la parte superior del abdomen o en muriel o ambos hombros o brazos. ? Aturdimiento o debilidad repentina. ? Latidos del corazón rápidos o irregulares.     · Tiene síntomas de un ataque cerebral. Estos pueden incluir:  ? Entumecimiento, hormigueo, debilidad o parálisis repentinos en la nash, el brazo o la pierna, sobre todo en un solo lado del cuerpo. ? Cambios repentinos en la visión. ? Dificultades repentinas para hablar. ? Confusión repentina o dificultad súbita para comprender frases sencillas. ? Problemas repentinos para caminar o mantener el equilibrio. ? Dolor de [place] intenso y repentino, distinto de los annalise de ruben anteriores.     · Tiene un dolor intenso en la espalda o el abdomen.    No espere a que la presión arterial baje por sí min. Obtenga ayuda de inmediato.   Llame a leger médico ahora mismo o busque atención de inmediato si:    · Tiene la presión arterial mucho más guerda de lo normal (shira 180/110 o más guerda), dior no tiene síntomas.     · Piensa que la presión arterial guerda le está provocando síntomas, shira:  ? Dolor de ruben intenso. ? Visión borrosa.    Vigile de cerca los cambios en leger virgin[place], y asegúrese de comunicarse con leger médico si:    · Leger presión arterial mide 140/90 o más en al menos 2 ocasiones.  [place] significa que el número de [place] es 140 o superior o el número de [facility] es 80 o superior, o ambas cosas.     · Matilda que podría estar teniendo efectos secundarios de los medicamentos para la presión arterial.     · Leger presión arterial suele ser normal, tate se eleva por encima de lo normal en al menos 2 ocasiones. ¿Dónde puede encontrar más información en inglés? Machelle Whelan a [email or web address] Elizabeth Mclain V519 en la búsqueda para aprender más acerca de \"Presión arterial guerda: Instrucciones de cuidado - [ High Blood Pressure: Care Instructions ]. \"  Revisado: 22 julio, 2018  Versión del contenido: 11.9  © 7750-6067 Healthwise, Incorporated. Las instrucciones de cuidado fueron adaptadas bajo licencia por [employer] (which disclaims liability or warranty for this information). Si usted tiene [place] afección médica o sobre estas instrucciones, siempre pregunte a leger profesional de [place]. Healthwise, Incorporated niega toda garantía o responsabilidad por leger uso de esta información. Patient Education        Dolor de pecho: Instrucciones de cuidado - [ Chest Pain: Care Instructions ]  Instrucciones de cuidado    El dolor de pecho puede tener muchas causas. Algunas no son graves y mejorarán por sí solas en pocos días. Tate algunos tipos de dolor de pecho requieren más pruebas y [place]. Es posible que leger médico le haya recomendado hayley visita de seguimiento dentro de las 8 a 12 horas siguientes. Si no mejora, es posible que necesite [address] pruebas o [place]. Aunque leger médico le haya dado de guerda, es necesario que esté atento a cualquier problema que se presente. El médico le hizo un cuidadoso chequeo, tate a veces los problemas pueden aparecer posteriormente. Si tiene nuevos síntomas o éstos no mejoran, obtenga atención médica de inmediato. Si tiene dolor o presión en el pecho que empeora o es diferente y que dura más de 5 minutos, o se desmayó (perdió el conocimiento), llame al 911 o busque otra ayuda de emergencia de inmediato.   Acudir a hayley consulta médica es sólo un paso en leger tratamiento. Aunque se sienta mejor, todavía deberá hacer lo que leger médico le recomiende, shira asistir a todas las visitas de seguimiento sugeridas y paul los medicamentos exactamente [employer]	[place] fueron indicados. [place] le ayudará a recuperarse y prevenir problemas futuros. ¿Cómo puede cuidarse en el hogar? · Descanse hasta que se sienta mejor. · [place] shoshana medicamentos exactamente shira le fueron recetados. Llame a leger médico si pushpa estar teniendo problemas con leger medicamento. · No conduzca después de paul un analgésico (medicamento para el dolor) recetado. ¿Cuándo debe pedir ayuda? Llame al 911 si:    · Se desmayó (perdió el conocimiento).     · Tiene graves dificultades para respirar.     · Tiene síntomas de un ataque al corazón. Estos podrían incluir:  ? Andrew Buggy en el pecho, o hayley sensación extraña en el pecho.  ? Sudoración. ? Falta de aire. ? Náuseas o vómito. ? Dolor, presión o hayley sensación extraña en la espalda, el felecia, la mandíbula, la parte superior del abdomen o en muriel o ambos hombros o brazos. ? Aturdimiento o debilidad repentina. ? Latidos del corazón rápidos o irregulares. Después de llamar al 911, es posible que el operador le diga que mastique 1 aspirina para adultos o de 2 a 4 aspirinas de dosis baja. Espere a hayley ambulancia. No intente conducir usted mismo.    Llame hoy a leger médico si:    · Tiene cualquier dificultad para respirar.     · El dolor en el pecho empeora.     · Siente mareos o aturdimiento, o que está a punto de desmayarse.     · No mejora shira se esperaba.     · Tiene dolor de pecho nuevo o diferente. ¿Dónde puede encontrar más información en inglés? Jose Litter a [email or web address] Escriba A120 en la búsqueda para aprender más acerca de \"Dolor de pecho: Instrucciones de cuidado - [ Chest Pain: Care Instructions ]. \"  Revisado: 23 septiembre, 2018  Versión del contenido: 11.9  © 5442-9305 Healthwise, Incorporated. Las instrucciones de cuidado fueron adaptadas bajo licencia por Good [employer] Conne[employer] (which disclaims liability or warranty for this information). Si usted tiene [place] afección médica o sobre estas instrucciones, siempre pregunte a leger profesional de virgin[place]. [employer] niega toda garantía o responsabilidad por leger uso de esta información.

## 2019-03-23 NOTE — ED TRIAGE NOTES
Pt. States started with numbness and chest pain about 3 days ago, states also has headache. Pt. Has been without BP medications for about 4 months.

## 2019-03-25 LAB
ATRIAL RATE: 108 BPM
ATRIAL RATE: 74 BPM
CALCULATED P AXIS, ECG09: 60 DEGREES
CALCULATED P AXIS, ECG09: 65 DEGREES
CALCULATED R AXIS, ECG10: 14 DEGREES
CALCULATED R AXIS, ECG10: 6 DEGREES
CALCULATED T AXIS, ECG11: 13 DEGREES
CALCULATED T AXIS, ECG11: 24 DEGREES
DIAGNOSIS, 93000: NORMAL
DIAGNOSIS, 93000: NORMAL
P-R INTERVAL, ECG05: 142 MS
P-R INTERVAL, ECG05: 156 MS
Q-T INTERVAL, ECG07: 354 MS
Q-T INTERVAL, ECG07: 400 MS
QRS DURATION, ECG06: 84 MS
QRS DURATION, ECG06: 86 MS
QTC CALCULATION (BEZET), ECG08: 444 MS
QTC CALCULATION (BEZET), ECG08: 474 MS
VENTRICULAR RATE, ECG03: 108 BPM
VENTRICULAR RATE, ECG03: 74 BPM

## 2019-04-02 ENCOUNTER — HOSPITAL ENCOUNTER (OUTPATIENT)
Dept: LAB | Age: 54
Discharge: HOME | End: 2019-04-02

## 2019-04-02 ENCOUNTER — OFFICE VISIT (OUTPATIENT)
Dept: FAMILY MEDICINE CLINIC | Age: 54
End: 2019-04-02

## 2019-04-02 VITALS
TEMPERATURE: 98.3 F | BODY MASS INDEX: 53.09 KG/M2 | DIASTOLIC BLOOD PRESSURE: 85 MMHG | SYSTOLIC BLOOD PRESSURE: 153 MMHG | HEART RATE: 72 BPM | WEIGHT: 254 LBS

## 2019-04-02 DIAGNOSIS — I10 ESSENTIAL HYPERTENSION: ICD-10-CM

## 2019-04-02 DIAGNOSIS — R73.03 PREDIABETES: ICD-10-CM

## 2019-04-02 DIAGNOSIS — R73.03 PREDIABETES: Primary | ICD-10-CM

## 2019-04-02 LAB
EST. AVERAGE GLUCOSE BLD GHB EST-MCNC: 117 MG/DL
HBA1C MFR BLD: 5.7 % (ref 4.2–6.3)

## 2019-04-02 PROCEDURE — 80061 LIPID PANEL: CPT

## 2019-04-02 PROCEDURE — 83036 HEMOGLOBIN GLYCOSYLATED A1C: CPT

## 2019-04-02 RX ORDER — ATENOLOL AND CHLORTHALIDONE TABLET 50; 25 MG/1; MG/1
0.5 TABLET ORAL DAILY
Qty: 90 TAB | Refills: 1 | Status: SHIPPED | OUTPATIENT
Start: 2019-04-02 | End: 2020-04-22 | Stop reason: SDUPTHER

## 2019-04-02 NOTE — PATIENT INSTRUCTIONS
Dieta DASH: Instrucciones de cuidado - [ DASH Diet: Care Instructions ] Instrucciones de cuidado La dieta DASH es un plan de alimentación que puede ayudar a bajar la presión arterial. DASH es la sigla en inglés de \"Dietary Approaches to Stop Hypertension\" (medidas dietéticas para disminuir la hipertensión). Hipertensión significa presión arterial guerda. La dieta DASH se concentra en el consumo de alimentos con alto contenido de calcio, potasio y [place]. Estos nutrientes pueden disminuir la presión arterial. Los alimentos con el mayor contenido de [place] nutrientes son las frutas, las verduras, los productos lácteos de bajo contenido de [place], las nueces, las semillas y las legumbres. Tate paul suplementos de calcio, potasio y magnesio, en lugar de comer alimentos ricos en estos nutrientes, no tiene el mismo efecto. La dieta DASH también incluye granos integrales, pescado y aves. La dieta DASH es muriel de los cambios de estilo de shalini que quizá le recomiende leger médico para reducir la presión arterial guerda. Es posible que leger médico también quiera que usted reduzca la cantidad de sodio en leger Anthony Jamalkheather. Reducir el sodio mientras sigue la dieta DASH puede bajar la presión arterial incluso más que únicamente con la dieta DASH. La atención de seguimiento es hayley parte clave de leger tratamiento y seguridad. Asegúrese de hacer y acudir a todas las citas, y llame a leger médico si está teniendo problemas. También es hayley buena idea saber los resultados de shoshana exámenes y mantener hayley lista de los medicamentos que nadine. Cómo puede cuidarse en el hogar? Cómo seguir la dieta DASH 
· Coma entre 4 y 5 porciones de fruta al día. Hayley porción incluye 1 fruta de [place], ½ taza de fruta enlatada o cortada en trozos, 1/4 taza de fruta seca o 4 onzas (½ taza) de jugo de frutas. Elija fruta con más frecuencia que jugo. · Consuma entre 4 y 11 porciones de verduras al día.  Hayley porción incluye 1 taza de slim o de verduras de hojas crudas, ½ taza de verduras cocidas o cortadas en trozos, o 4 onzas (½ taza) de jugo de verduras. Elija comer verduras con más frecuencia que paul leger jugo. · Consuma entre 2 y 3 porciones de lácteos semidescremados y descremados al día. Hayley porción incluye 8 onzas de [place], 1 taza de yogur o 1½ onzas de [place]bar[place]. · Coma entre 6 y 6 porciones de granos todos los [address] Hayley porción incluye 1 rebanada de pan, 1 onza de cereal seco, o ½ taza de arroz, pasta o cereal cocido. Trate de elegir productos de grano integral con la mayor frecuencia posible. · Limite la cantidad de [place] aves y pescado a 2 porciones al día. [place] porción son 3 onzas, lo que es aproximadamente el tamaño de un yesi de naipes. · Coma entre 4 y 5 porciones de nueces, semillas y legumbres (frijoles [habichuelas], lentejas y arvejas [chícharos] secos y cocidos) a la semana. Hayley porción incluye 1/3 taza de nueces, 2 cucharadas de semillas o ½ taza de frijoles o arvejas cocidos. · [facility] [facility] y aceites a 2 o 3 porciones al día. Hayley porción es 1 cucharadita de aceite vegetal o 2 cucharadas de aderezo para ensaladas. · Limite los dulces y el azúcar adicional a 5 porciones o menos por semana. [place] porción es 1 cucharada de [place] o [place], ½ taza de sorbete o 1 taza de limonada. · Consuma menos de 2,300 miligramos (mg) de sodio al día. Si limita leger consumo de sodio a 1,500 mg al día, puede reducir leger presión arterial aún más. Consejos para tener éxito · Inicie con cambios pequeños. No intente hacer cambios radicales en leger dieta de manera repentina. Podría sentir que extraña shoshana comidas favoritas y, por lo [place], wil hayley mayor probabilidad de que no cumpla el plan. [place] Clement y [place]. Radhika Prey que esos cambios se conviertan en un hábito, incorpore algunos [employer].  
· Pruebe algo de lo siguiente: 
? Fíjese el objetivo de comer hayley porción de fruta o de verduras en todas las comidas y los refrigerios. [place] facilitará alcanzar la cantidad diaria recomendada de frutas y verduras. ? Pruebe comer yogur cubierto con frutas frescas y nueces shira refrigerio o shira postre saludable. ? Agregue slim, tomate, pepino y cebolla a shoshana sándwiches. ? Combine hayley masa de pizza precocida con queso mozzarella de bajo contenido de grasa (\"low-fat\") y cúbralo con abundantes verduras. Intente utilizar tomates, calabaza, espinaca, brócoli, zanahorias, coliflor y cebollas. ? Opte por comer hayley variedad de verduras cortadas en trozos con un aderezo de bajo contenido de grasa shira refrigerio, en lugar de \"chips\" (tipo manjinder fritas) y un \"dip\" (producto untable). ? [place] semillas de girasol o almendras picadas [employer]. O intente agregar nueces o almendras picadas a las verduras cocidas. ? Pruebe algunas comidas vegetarianas con frijoles y arvejas. Sarahann Ejan Baptiste o frijoles rojos a las ensaladas. Prepare burritos y tacos con puré de frijoles pintos o negros. Dónde puede encontrar más información en inglés? Suad Hamm a [email or web address] Christine Forth [id number] en la búsqueda para aprender más acerca de \"Dieta DASH: Instrucciones de cuidado - [ DASH Diet: Care Instructions ]. \" 
Revisado: 22 julio, 2018 Versión del contenido: 11.9 © 9102-2286 [employer] Incorporated. Las instrucciones de cuidado fueron adaptadas bajo licencia por Good Help Connections (which disclaims liability or warranty for this information). Si usted tiene [place] afección médica o sobre estas instrucciones, siempre pregunte a leger profesional de virgin[place]. [facility] Incorporated niega toda garantía o responsabilidad por leger uso de esta información. Hipertensión arterial aguda: Instrucciones de cuidado - [ Acute High Blood Pressure: Care Instructions ] Instrucciones de cuidado La hipertensión arterial aguda es presión arterial muy guerda.  Es un problema mukesh. La presión arterial muy guerda puede dañar el cerebro, el corazón, los ojos y los riñones. Es posible que le hayan dado medicamentos para disminuirle la presión arterial. Quizás se los dieron en forma de píldora o a través de hayley aguja en hayley de shoshana venas. A esto se le llama intravenosamente, o por IV. Y puede que [employer] hayan dado otros medicamentos. Estos pueden ser necesarios si la presión arterial le causa otros problemas. Para mantener leger presión arterial a un [address] TRACY mccoy, es posible que deba hacer cambios saludables en leger estilo de shalini. Y probablemente será necesario que tome medicamentos. Asegúrese de hacer un seguimiento con leger médico sobre leger presión arterial y lo que puede hacer al [employer]. La atención de seguimiento es hayley parte clave de leger tratamiento y seguridad. Asegúrese de hacer y acudir a todas las citas, y llame a leger médico si está teniendo problemas. También es hayley buena idea saber los resultados de shoshana exámenes y mantener hayley lista de los medicamentos que nadine. Cómo puede cuidarse en el hogar? · Alex a leger médico tanto shira se lo recomiende. [place] es para asegurarse de que leger presión arterial esté bajo control. Probablemente tendrá que ir al menos 2 veces al [place]. [place] quizás sea más a menudo al principio. · [employer] medicamentos para la presión arterial exactamente shira le fueron recetados. Puede que tome un tipo o más de [place]. Estos incluyen diuréticos, betabloqueantes, inhibidores de la ECA, antagonistas del calcio y [place] de los receptores de la angiotensina II. Llame a leger médico si piensa que está teniendo un problema con leger medicamento. · Si nadine medicamentos para la presión arterial, hable con leger médico antes de paul descongestionantes o antiinflamatorios, shira ibuprofeno. Estos pueden aumentar la presión arterial. 
· Aprenda a revisarse la presión arterial en casa. Revísela con frecuencia. · Pregúntele a leger médico si puede paul alcohol. · Hable con leger médico sobre cambios de estilo de shalini que puedan reducir la presión arterial, shira ser [place] y no fumar. Cuándo debe pedir ayuda? Llame al 911 en cualquier momento que considere que necesita atención de [place]. [place] puede significar que tiene síntomas que sugieren que leger presión arterial le está causando un problema cardíaco o vascular grave. Leger presión arterial podría ser superior a 180/110. 
 Por ejemplo, llame al 911 si: 
  · Tiene síntomas de un ataque al corazón. Estos pueden incluir: ? Larene Lick en el pecho, o hayley sensación extraña en el pecho. 
? Sudoración. ? Falta de aire. ? Náuseas o vómito. ? Dolor, presión o hayley sensación extraña en la espalda, el felecia, la mandíbula, la parte superior del abdomen o en muriel o ambos hombros o brazos. ? Aturdimiento o debilidad repentina. ? Latidos del corazón rápidos o irregulares.  
  · Tiene síntomas de un ataque cerebral. Estos pueden incluir: ? Entumecimiento, hormigueo, debilidad o parálisis repentinos en la nash, el brazo o la pierna, sobre todo en un solo lado del cuerpo. ? Cambios repentinos en la visión. ? Dificultades repentinas para hablar. ? Confusión repentina o dificultad súbita para comprender frases sencillas. ? Problemas repentinos para caminar o mantener el equilibrio. ? Dolor de [place] intenso y repentino, distinto de los annalise de ruben anteriores.  
  · Tiene un dolor intenso en la espalda o el abdomen.  
 No espere a que la presión arterial baje por sí min. Obtenga ayuda de inmediato. 
 Llame a leger médico ahora mismo o busque atención de inmediato si: 
  · Tiene la presión arterial mucho más guerda de lo normal (shira 180/110 o más guerda), dior no tiene síntomas.  
  · Piensa que la presión arterial guerda le está provocando síntomas, shira: ? Dolor de ruben intenso. ?  Visión borrosa.  
 Vigile de cerca los cambios en leger [place], y asegúrese de comunicarse con leger médico si: 
   · Leger presión arterial mide 140/90 o más en al menos 2 ocasiones. [place] significa que el número de [place] es 140 o superior o el número de abajo es 80 o superior, o ambas cosas.  
  · Matilda que podría estar teniendo efectos secundarios de los medicamentos para la presión arterial.  
  · Leger presión arterial suele ser normal, dior se eleva por encima de lo normal en al menos 2 ocasiones. Dónde puede encontrar más información en inglés? Jasbir Martinez a [email or web address] Mary Agarwal Q169 en la búsqueda para aprender más acerca de \"Hipertensión arterial aguda: Instrucciones de cuidado - [ Acute High Blood Pressure: Care Instructions ]. \" 
Revisado: 22 julio, 2018 Versión del contenido: 11.9 © 5649-1802 Healthwise, Incorporated. Las instrucciones de cuidado fueron adaptadas bajo licencia por Good Amanda Huff [employer]BA [employer] (which disclaims liability or warranty for this information). Si usted tiene [place] afección médica o sobre estas instrucciones, siempre pregunte a leger profesional de [place]. Healthwise, Incorporated niega toda garantía o responsabilidad por leger uso de esta información.

## 2019-04-02 NOTE — PROGRESS NOTES
[address] [address] is a 53 y.o. female Issues discussed today include: Chief Complaint Patient presents with  Hypertension f/u  
 
 
1) HTN:  Went to the ER on 3/23/19 with CP, SOB, whole body numbness, nausea and dizziness. Had CXR, CTA chest, CMP, CBC, proBNP and troponin there. She was given hydralazine and lasix in the ER with improvement in BP from 221/100 to 170/90 then to 132/70. Pt says she felt better at time of ER discharge. She was given rx for lisinopril 10mg daily - has been taking, but has throat/tongue irritation and drowsiness, so now taking 1/2 tab daily. Day before yesterday in the night felt similar whole body numbness. Denies recurrent CP, SOB, nausea. No leg swelling. Data reviewed or ordered today:    
 
Other problems include: 
Patient Active Problem List  
Diagnosis Code  Hypertension I10  
 Prediabetes R73.03  Slow transit constipation K59.01  
 Obesity, morbid (HCC) E66.01 Medications: 
Current Outpatient Medications Medication Sig Dispense Refill  atenolol-chlorthalidone (TENORETIC) 50-25 mg per tablet Take 0.5 Tabs by mouth daily. For HTN. Melanie la mitad por la boca diaria para presion guerda 90 Tab 1  
 omega 3-dha-epa-fish oil (FISH OIL) 900-1,400 mg cpDR Twice a day  aspirin delayed-release 81 mg tablet Take 1 Tab by mouth daily. To protect your heart. Turkmen sig 30 Tab 1 Allergies: Allergies Allergen Reactions  Lisinopril Other (comments) Tongue and throat irritation/numbness LMP:  Patient's last menstrual period was 06/02/2014. Social History Socioeconomic History  Marital status: SINGLE Spouse name: Not on file  Number of children: Not on file  Years of education: Not on file  Highest education level: Not on file Occupational History  Not on file Social Needs  Financial resource strain: Not on file  Food insecurity:  
  Worry: Not on file Inability: Not on file  Transportation needs:  
  Medical: Not on file Non-medical: Not on file Tobacco Use  Smoking status: Never Smoker  Smokeless tobacco: Never Used Substance and Sexual Activity  Alcohol use: No  
  Alcohol/week: 0.0 oz  Drug use: No  
 Sexual activity: Not on file Lifestyle  Physical activity:  
  Days per week: Not on file Minutes per session: Not on file  Stress: Not on file Relationships  Social connections:  
  Talks on phone: Not on file Gets together: Not on file Attends Yarsani service: Not on file Active member of club or organization: Not on file Attends meetings of clubs or organizations: Not on file Relationship status: Not on file  Intimate partner violence:  
  Fear of current or ex partner: Not on file Emotionally abused: Not on file Physically abused: Not on file Forced sexual activity: Not on file Other Topics Concern  Not on file Social History Narrative  Not on file Family History Problem Relation Age of Onset  Hypertension Neg Hx  Diabetes Neg Hx Physical Exam  
Visit Vitals /85 (BP 1 Location: Left arm, BP Patient Position: Sitting) Pulse 72 Temp 98.3 °F (36.8 °C) (Oral) Wt 254 lb (115.2 kg) LMP 06/02/2014 BMI 53.09 kg/m² BP Readings from Last 3 Encounters:  
04/02/19 153/85  
03/23/19 130/77  
04/14/18 133/79 Constitutional: Appears well,  No acute distress, Vitals noted Psychiatric:  Affect normal, Alert and Oriented to person/place/time Eyes:  Conjunctiva clear, no drainage ENT:  External ears and nose normal, Mucous membranes moist 
Neck:  General inspection normal. Supple. Heart:  Normal HR, Normal S1 and S2,  Regular rhythm. No murmurs, rubs or gallops. Lungs:  Clear to auscultation, good respiratory effort, no wheezes, rales or rhonchi Extremities: Without edema, good peripheral pulses Skin:  Warm to palpation, without rashes Assessment/Plan: ICD-10-CM ICD-9-CM 1. Prediabetes R73.03 790.29 HEMOGLOBIN A1C WITH EAG 2. Essential hypertension I10 401.9 atenolol-chlorthalidone (TENORETIC) 50-25 mg per tablet LIPID PANEL  
 
 
53 y.o. female with recent ER visit for hypertensive emergency, BP up to 221/100 and concerning sxs for ACS, CVA. Her BP improved per flowsheets with therapy given in ER and pt reports sxs improved. Labs from 3/23 reviewed and largely reassuring. CXR showed vascular congestion without manuel pulm edema and proBNP was low at 23. Pt denies CP, SOB, leg swelling since the ER. BP a little high, but not terrible on lisinopril 5mg daily. 
- Stop lisinopril given tongue/throar sxs (added to allergy list) - Resume prior BP med of atenolol-chlorthalidone 50-25mg to be taken 1/2 tab daily for now - Labs today - will call if abnormal or she can call in a week to know results - ER precautions - go to ER if having CP, SOB, nausea, dizziness, leg swelling. If sxs recur, recommend pt to have head imaging and lexiscan stress test 
 
Nurse to provider [facility] flyer and dental resource page Follow-up and Dispositions · Return in about 4 months (around 8/2/2019) for HTN f/u appt, sooner if recurrent sxs or other concerns. Eamon Guzman MD 
[address] [place] [address], [address] [place] [address]

## 2019-04-02 NOTE — PROGRESS NOTES
Patient seen for discharge with assistance from eric Maloney. We reviewed the AVS instructions about BP monitoring, ER precautions and the DASH diet, today's prescription, pharmacy location and the printed Good Rx coupon. The patient was also given the dental resources handout in Frisian as well as the [facility] handout also printed in [email or web address]. She understands to call [facility] to schedule a mammogram. She has been to the lab today and will go now to registration to schedule a 4 month provider follow-up appointment. She understands to bring her BP log to her follow-up appointment.  Carol Gonzáles RN

## 2019-04-03 LAB
CHOLEST SERPL-MCNC: 165 MG/DL
HDLC SERPL-MCNC: 51 MG/DL
HDLC SERPL: 3.2 {RATIO} (ref 0–5)
LDLC SERPL CALC-MCNC: 90 MG/DL (ref 0–100)
LIPID PROFILE,FLP: NORMAL
TRIGL SERPL-MCNC: 120 MG/DL (ref ?–150)
VLDLC SERPL CALC-MCNC: 24 MG/DL

## 2019-04-03 NOTE — PROGRESS NOTES
Lipid panel - cholesterol is good and at goal of totcal cholesterol < 200 and LDL (bad cholesterol) < 100  A1c 5.7% remains in low pre-DM range  Will send letter to inform pt of results and rec lifestyle efforts

## 2019-07-11 ENCOUNTER — OFFICE VISIT (OUTPATIENT)
Dept: FAMILY MEDICINE CLINIC | Age: 54
End: 2019-07-11

## 2019-07-11 ENCOUNTER — HOSPITAL ENCOUNTER (OUTPATIENT)
Dept: LAB | Age: 54
Discharge: HOME | End: 2019-07-11

## 2019-07-11 VITALS
WEIGHT: 254 LBS | HEART RATE: 64 BPM | BODY MASS INDEX: 53.09 KG/M2 | SYSTOLIC BLOOD PRESSURE: 136 MMHG | DIASTOLIC BLOOD PRESSURE: 83 MMHG | TEMPERATURE: 97.7 F

## 2019-07-11 DIAGNOSIS — R73.03 PRE-DIABETES: ICD-10-CM

## 2019-07-11 DIAGNOSIS — R73.03 PRE-DIABETES: Primary | ICD-10-CM

## 2019-07-11 DIAGNOSIS — K14.3: ICD-10-CM

## 2019-07-11 DIAGNOSIS — R42 DIZZINESS: ICD-10-CM

## 2019-07-11 DIAGNOSIS — R05.9 COUGH: ICD-10-CM

## 2019-07-11 LAB
ANION GAP SERPL CALC-SCNC: 8 MMOL/L (ref 5–15)
BASOPHILS # BLD: 0 K/UL (ref 0–0.1)
BASOPHILS NFR BLD: 0 % (ref 0–1)
BUN SERPL-MCNC: 12 MG/DL (ref 6–20)
BUN/CREAT SERPL: 18 (ref 12–20)
CALCIUM SERPL-MCNC: 9.2 MG/DL (ref 8.5–10.1)
CHLORIDE SERPL-SCNC: 104 MMOL/L (ref 97–108)
CO2 SERPL-SCNC: 27 MMOL/L (ref 21–32)
CREAT SERPL-MCNC: 0.67 MG/DL (ref 0.55–1.02)
DIFFERENTIAL METHOD BLD: NORMAL
EOSINOPHIL # BLD: 0.1 K/UL (ref 0–0.4)
EOSINOPHIL NFR BLD: 1 % (ref 0–7)
ERYTHROCYTE [DISTWIDTH] IN BLOOD BY AUTOMATED COUNT: 12.2 % (ref 11.5–14.5)
EST. AVERAGE GLUCOSE BLD GHB EST-MCNC: 123 MG/DL
GLUCOSE SERPL-MCNC: 74 MG/DL (ref 65–100)
HBA1C MFR BLD: 5.9 % (ref 4.2–6.3)
HCT VFR BLD AUTO: 45 % (ref 35–47)
HGB BLD-MCNC: 14.1 G/DL (ref 11.5–16)
IMM GRANULOCYTES # BLD AUTO: 0 K/UL (ref 0–0.04)
IMM GRANULOCYTES NFR BLD AUTO: 0 % (ref 0–0.5)
LYMPHOCYTES # BLD: 3.4 K/UL (ref 0.8–3.5)
LYMPHOCYTES NFR BLD: 44 % (ref 12–49)
MCH RBC QN AUTO: 28.8 PG (ref 26–34)
MCHC RBC AUTO-ENTMCNC: 31.3 G/DL (ref 30–36.5)
MCV RBC AUTO: 91.8 FL (ref 80–99)
MONOCYTES # BLD: 0.6 K/UL (ref 0–1)
MONOCYTES NFR BLD: 8 % (ref 5–13)
NEUTS SEG # BLD: 3.6 K/UL (ref 1.8–8)
NEUTS SEG NFR BLD: 47 % (ref 32–75)
NRBC # BLD: 0 K/UL (ref 0–0.01)
NRBC BLD-RTO: 0 PER 100 WBC
PLATELET # BLD AUTO: 273 K/UL (ref 150–400)
PMV BLD AUTO: 10.3 FL (ref 8.9–12.9)
POTASSIUM SERPL-SCNC: 3.7 MMOL/L (ref 3.5–5.1)
RBC # BLD AUTO: 4.9 M/UL (ref 3.8–5.2)
SODIUM SERPL-SCNC: 139 MMOL/L (ref 136–145)
WBC # BLD AUTO: 7.8 K/UL (ref 3.6–11)

## 2019-07-11 PROCEDURE — 80048 BASIC METABOLIC PNL TOTAL CA: CPT

## 2019-07-11 PROCEDURE — 85025 COMPLETE CBC W/AUTO DIFF WBC: CPT

## 2019-07-11 PROCEDURE — 83036 HEMOGLOBIN GLYCOSYLATED A1C: CPT

## 2019-07-11 RX ORDER — NYSTATIN 100000 [USP'U]/ML
1 SUSPENSION ORAL 4 TIMES DAILY
Qty: 200 ML | Refills: 0 | Status: SHIPPED | OUTPATIENT
Start: 2019-07-11 | End: 2019-07-21

## 2019-07-11 RX ORDER — GUAIFENESIN 600 MG/1
600 TABLET, EXTENDED RELEASE ORAL 2 TIMES DAILY
Qty: 28 TAB | Refills: 0 | Status: SHIPPED | OUTPATIENT
Start: 2019-07-11 | End: 2020-04-27

## 2019-07-11 NOTE — PROGRESS NOTES
Reviewed AVS, prescription and pharmacy location with patient. AVS printed and given. Patient aware that provider would like to follow up about today's visit in 3 months as a walk for HTN, or sooner as needed if s/s from today's visit not improving. If patient run out of medication in the meantime to go to the pharmacy to request a refill. Patient aware that she will receive a call from [facility] staff if labs abnormal. This has been fully explained to the patient, who indicates understanding and agrees with plan. No further questions at this time.  Alberto Rod RN

## 2019-07-11 NOTE — PROGRESS NOTES
Assessment/Plan:       ICD-10-CM ICD-9-CM    1. Pre-diabetes R73.03 790.29 HEMOGLOBIN A1C WITH EAG   2. Dizziness H30 631.3 METABOLIC PANEL, BASIC      CBC WITH AUTOMATED DIFF   3. 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 Cough R05 786.2 guaiFENesin ER (MUCINEX) 600 mg ER tablet   New pill for cough, mouth rinse for tongue/mouth/gums. If not improving please return. Will call if any labs abnormal.  Follow-up and Dispositions    · Return if symptoms worsen or fail to improve. [address] 149, [address] [email or web address] [place] [id number]  Phone: [phone] Fax: [phone]      [address]  Subjective:     Chief Complaint   Patient presents with    Hypertension    Gum Problem    Lelia Dumont is a 53 y.o. OTHER female. HPI:   Measuring bp at home? No  Occ with dizziness, when she is lying down, for seconds. Gum Problem:  Describe - Inflammation, balls on the tongue and throat. No change in eating or drinking; has bitter taste in the mouth, constant, daily. Also at night. Started - a month ago. It is worse now. The throat is now itchy and she has cough. Ever before? No  What provokes it? She  has a past medical history of Hypertension, Obesity, and Prediabetes. She also has no past medical history of Diabetes ([address] or Sleep apnea. Review of Systems: Negative for: fever, chest pain, shortness of breath, leg swelling, exertional dyspnea, palpitations. Current Medications:   Taking medication correctly? Yes  Current Outpatient Medications on File Prior to Visit   Medication Sig    atenolol-chlorthalidone (TENORETIC) 50-25 mg per tablet Take 0.5 Tabs by mouth daily. For HTN. Melanie la mitad por la boca diaria para presion guerda    omega 3-dha-epa-fish oil (FISH OIL) 900-1,400 mg cpDR Twice a day    aspirin delayed-release 81 mg tablet Take 1 Tab by mouth daily.  To protect your heart. Jamaican sig     No current facility-administered medications on file prior to visit. Social History: She  reports that she has never smoked. She has never used smokeless tobacco. She reports that she does not drink alcohol or use drugs. Objective:     Vitals:    07/11/19 1122   BP: 136/83   Pulse: 64   Temp: 97.7 °F (36.5 °C)   TempSrc: Oral   Weight: 254 lb (115.2 kg)    Patient's last menstrual period was 06/02/2014. Wt Readings from Last 2 Encounters:   07/11/19 254 lb (115.2 kg)   04/02/19 254 lb (115.2 kg)     No results found for any visits on 07/11/19. Physical Examination: Tongue appears to have whitish coating. General appearance - well developed, no acute distress. Chest - clear to auscultation. Heart - regular rate and rhythm without murmurs, rubs, or gallops. Abdomen - bowel sounds present x 4, NT, ND. Extremities - pulses intact. No peripheral edema. Assessment/Plan:   Diagnoses and all orders for this visit:    1. Pre-diabetes  -     HEMOGLOBIN A1C WITH EAG; Future    2. Dizziness  -     METABOLIC PANEL, BASIC; Future  -     CBC WITH AUTOMATED DIFF; Future    3. Overgrowth filiform papillae  -     nystatin (MYCOSTATIN) 100,000 unit/mL suspension; Take 5 mL by mouth four (4) times daily for 10 days. For tongue and mouth, swish and spit, Jamaican sig    4. Cough  -     guaiFENesin ER (MUCINEX) 600 mg ER tablet; Take 1 Tab by mouth two (2) times a day. For cough; Jamaican sig      Follow-up and Dispositions    · Return if symptoms worsen or fail to improve. Denies relationship of food to the bitter taste of her mouth. Sofie Mccloud, DNP, FNP-BC, BC-ADM  Lelia Zavaleta expressed understanding of this plan.

## 2019-07-11 NOTE — PROGRESS NOTES
Coordination of Care  1. Have you been to the ER, urgent care clinic since your last visit? Hospitalized since your last visit? No    2. Have you seen or consulted any other health care providers outside of the [address] [place] [address] since your last visit? Include any pap smears or colon screening. No    Does the patient need refills? YES    Learning Assessment Complete?  yes

## 2019-10-21 ENCOUNTER — OFFICE VISIT (OUTPATIENT)
Dept: FAMILY PLANNING/WOMEN'S HEALTH CLINIC | Age: 54
End: 2019-10-21

## 2019-10-21 ENCOUNTER — HOSPITAL ENCOUNTER (OUTPATIENT)
Dept: LAB | Age: 54
Discharge: HOME | End: 2019-10-21

## 2019-10-21 ENCOUNTER — HOSPITAL ENCOUNTER (OUTPATIENT)
Dept: MAMMOGRAPHY | Age: 54
Discharge: HOME | End: 2019-10-21

## 2019-10-21 DIAGNOSIS — Z12.31 VISIT FOR SCREENING MAMMOGRAM: ICD-10-CM

## 2019-10-21 DIAGNOSIS — Z01.419 ENCOUNTER FOR WELL WOMAN EXAM: Primary | ICD-10-CM

## 2019-10-21 PROCEDURE — 88175 CYTOPATH C/V AUTO FLUID REDO: CPT

## 2019-10-21 PROCEDURE — 77067 SCR MAMMO BI INCL CAD: CPT

## 2019-10-21 NOTE — PROGRESS NOTES
EVERY WOMANS LIFE HISTORY QUESTIONNAIRE       No Yes Comments   Has a doctor ever seen or felt anything wrong with your breast? [x]                                  []                                     Have you ever had a breast biopsy? [x]                                  []                                          When and where was last mammogram performed? 2015 [facility]    Have you ever been told that there was a problem on your mammogram?   No Yes Comments   []                                  []                                       Do you have breast implants? No Yes Comments   [x]                                  []                                       When was your last Pap test performed? 2015    Have you ever had an abnormal Pap test?   No Yes Comments   [x]                                  []                                       Have you had a hysterectomy? No Yes Comments (why)   [x]                                  []                                       Have you been through menopause? No Yes Date of LMP   []                                  [x]                                  2014     Did your mother take MARSHALL? No Yes Unknown   []                                  []                                  X     Do you have a history of HIV exposure? No Yes    [x]                                  []                                       Have you ever been diagnosed with any type of Cancer   No Yes Comments (type,when,where,type of treatment   [x]                                  []                                          Has a family member been diagnosed with breast or ovarian cancer?    No Yes Comments (which family members, and type   [x]                                  []                                       Are you taking hormone replacement therapy (HRT)     No Yes Comments   [x]                                  []                                       How many times have you been pregnant? 1        Number of live births ? 1    Are you experiencing any of the following? No Yes Comments   Nipple Discharge [x]                                  []                                     Breast Lump/Masses [x]                                  []                                     Breast Skin Changes [x]                                  []                                          No Yes Comments   Vaginal Discharge [x]                                  []                                     Abnormal/unusual vaginal bleeding [x]                                  []                                         Are you experiencing any other health problems? High blood pressure      Age at first period?   14  Age at first birth?  15    Ht-- 4-10    Wt-- 56

## 2020-04-22 DIAGNOSIS — I10 ESSENTIAL HYPERTENSION: ICD-10-CM

## 2020-04-22 RX ORDER — ATENOLOL AND CHLORTHALIDONE TABLET 50; 25 MG/1; MG/1
0.5 TABLET ORAL DAILY
Qty: 30 TAB | Refills: 0 | Status: SHIPPED | OUTPATIENT
Start: 2020-04-22 | End: 2020-04-27 | Stop reason: SDUPTHER

## 2020-04-22 NOTE — TELEPHONE ENCOUNTER
Received a refill request from [facility] in the [facility] office for Atenolol/HCTZ 50/25mg tablets take 1/2 tablet by mouth daily for HTN. Qty #30. Last ordered 04/02/19, #90, 1 refill. Last office visit 07/11/19. Routed to the provider for review.  Lisandro Suazo RN

## 2020-04-27 ENCOUNTER — OFFICE VISIT (OUTPATIENT)
Dept: FAMILY MEDICINE CLINIC | Age: 55
End: 2020-04-27

## 2020-04-27 VITALS — BODY MASS INDEX: 52.25 KG/M2 | WEIGHT: 250 LBS

## 2020-04-27 DIAGNOSIS — I10 ESSENTIAL HYPERTENSION: ICD-10-CM

## 2020-04-27 RX ORDER — ATENOLOL AND CHLORTHALIDONE TABLET 50; 25 MG/1; MG/1
0.5 TABLET ORAL DAILY
Qty: 45 TAB | Refills: 3 | Status: SHIPPED | OUTPATIENT
Start: 2020-04-27

## 2020-04-27 NOTE — PROGRESS NOTES
4/27/2020 [id number] [facility] consented to Telephone visit. Patient speaks  Malaysian.  was: Suresh Caon  Assessment/Plan:       ICD-10-CM ICD-9-CM    1. Essential hypertension I10 401.9 atenoloL-chlorthalidone (TENORETIC) 50-25 mg per tablet   6 month follow up  Follow-up and Dispositions    · Return in about 6 months (around 10/27/2020) for hypertension. Subjective:   Lelia Raines is a 63 y.o. female who presents for follow up of   Chief Complaint   Patient presents with    Medication Refill      HPI: Needs refills of her medication today. Hypertension:   Measuring blood pressures outside of clinic: YES usual but she doesn't remember the numbers  Medications:  no know adherence challenges  Current Outpatient Medications   Medication Sig Dispense Refill    atenoloL-chlorthalidone (TENORETIC) 50-25 mg per tablet Take 0.5 Tabs by mouth daily. For HTN. Melanie la mitad por la boca diaria para presion guerda 45 Tab 3    aspirin delayed-release 81 mg tablet Take 1 Tab by mouth daily. To protect your heart. Malaysian sig 30 Tab 1     Family History: family history is not on file. Social History:  reports that she has never smoked. She has never used smokeless tobacco. She reports that she does not drink alcohol or use drugs. Objective:     Vitals:    04/27/20 1353   Weight: 250 lb (113.4 kg)     Wt Readings from Last 2 Encounters:   04/27/20 250 lb (113.4 kg)   07/11/19 254 lb (115.2 kg)     Lab Results   Component Value Date/Time    Cholesterol, total 165 04/02/2019 10:50 AM    HDL Cholesterol 51 04/02/2019 10:50 AM    LDL, calculated 90 04/02/2019 10:50 AM    Triglyceride 120 04/02/2019 10:50 AM    CHOL/HDL Ratio 3.2 04/02/2019 10:50 AM     No results found for any visits on 04/27/20. Labs discussed with patient. No Physical exam due to telephone visit. Assessment/Plan:   Hypertension in uncertain control.    Low sodium/no salt added diet  Diagnoses and all orders for this visit: 1. Essential hypertension  -     atenoloL-chlorthalidone (TENORETIC) 50-25 mg per tablet; Take 0.5 Tabs by mouth daily. For HTN. Melanie la mitad por la boca diaria para presion guerda      LIV Perdomoa A chong Crenshaw expressed understanding of this plan.

## 2020-04-27 NOTE — PROGRESS NOTES
Coordination of Care  1. Have you been to the ER, urgent care clinic since your last visit? Hospitalized since your last visit? No    2. Have you seen or consulted any other health care providers outside of the [address] [place] [address] since your last visit? Include any pap smears or colon screening. No    Does the patient need refills? YES    Learning Assessment Complete?  yes  Depression Screening complete in the past 12 months? yes

## 2020-04-27 NOTE — PROGRESS NOTES
Discharge nurse encounter completed via telephoned call. Name and  verified. AVS, prescription and pharmacy location reviewed . Goodrx coupon code sent via text per patient's request. Patient to follow up in 6 months; message has been sent to registration. This has been fully explained to the patient, who indicates understanding and agrees with plan. No further questions at this time.  David Barreto RN

## 2020-05-14 NOTE — TELEPHONE ENCOUNTER
Received a refill request from the pt's Pharmacy, for the pt from the [facility] office staff for Atenolol/ chlorathalidone. The medication has already been refilled on 04/27/20. 45 tablets with 3 refills.  Esthela Cleary RN

## 2022-03-19 PROBLEM — E66.01 OBESITY, MORBID (HCC): Status: ACTIVE | Noted: 2018-04-14

## 2023-05-18 RX ORDER — ASPIRIN 81 MG/1
81 TABLET ORAL DAILY
COMMUNITY
Start: 2014-01-18

## 2023-05-18 RX ORDER — ATENOLOL AND CHLORTHALIDONE TABLET 50; 25 MG/1; MG/1
0.5 TABLET ORAL DAILY
COMMUNITY
Start: 2020-04-27